# Patient Record
Sex: MALE | URBAN - METROPOLITAN AREA
[De-identification: names, ages, dates, MRNs, and addresses within clinical notes are randomized per-mention and may not be internally consistent; named-entity substitution may affect disease eponyms.]

---

## 2020-03-11 ENCOUNTER — TELEPHONE (OUTPATIENT)
Dept: NEUROLOGY | Facility: HOSPITAL | Age: 75
End: 2020-03-11

## 2020-03-11 DIAGNOSIS — C7A.8 NEUROENDOCRINE CANCER: Primary | ICD-10-CM

## 2020-03-13 ENCOUNTER — TELEPHONE (OUTPATIENT)
Dept: NEUROLOGY | Facility: HOSPITAL | Age: 75
End: 2020-03-13

## 2020-03-13 NOTE — TELEPHONE ENCOUNTER
On Wed, Mar 11, 2020 at 9:56 AM Jesusita Herring <[email or web address]> wrote:  I received the records and will process now. Is there a day you would like to look at to schedule an appointment?        From: Yulissa Ramachandran <[email or web address]>   Sent: Tuesday, March 10, 2020 5:59 PM  To: Jesusita Herring [email or web address]>  Subject: [EXTERNAL] Confidential    Good Afternoon,     Ill let you know as soon as its okay to travel to [place]. Thank you.